# Patient Record
Sex: MALE | Race: WHITE | NOT HISPANIC OR LATINO
[De-identification: names, ages, dates, MRNs, and addresses within clinical notes are randomized per-mention and may not be internally consistent; named-entity substitution may affect disease eponyms.]

---

## 2021-06-24 ENCOUNTER — APPOINTMENT (OUTPATIENT)
Dept: UROLOGY | Facility: CLINIC | Age: 66
End: 2021-06-24
Payer: COMMERCIAL

## 2021-06-24 DIAGNOSIS — R31.0 GROSS HEMATURIA: ICD-10-CM

## 2021-06-24 DIAGNOSIS — N53.14 RETROGRADE EJACULATION: ICD-10-CM

## 2021-06-24 DIAGNOSIS — N39.0 URINARY TRACT INFECTION, SITE NOT SPECIFIED: ICD-10-CM

## 2021-06-24 LAB
BILIRUB UR QL STRIP: NORMAL
CLARITY UR: NORMAL
COLLECTION METHOD: NORMAL
GLUCOSE UR-MCNC: NORMAL
HCG UR QL: 0.2 EU/DL
HGB UR QL STRIP.AUTO: NORMAL
KETONES UR-MCNC: NORMAL
LEUKOCYTE ESTERASE UR QL STRIP: NORMAL
NITRITE UR QL STRIP: NORMAL
PH UR STRIP: 5.5
PROT UR STRIP-MCNC: 300
SP GR UR STRIP: 1.02

## 2021-06-24 PROCEDURE — 51798 US URINE CAPACITY MEASURE: CPT

## 2021-06-24 PROCEDURE — 52000 CYSTOURETHROSCOPY: CPT

## 2021-06-24 PROCEDURE — 99072 ADDL SUPL MATRL&STAF TM PHE: CPT

## 2021-06-24 PROCEDURE — 99204 OFFICE O/P NEW MOD 45 MIN: CPT | Mod: 25

## 2021-06-24 RX ORDER — SULFAMETHOXAZOLE AND TRIMETHOPRIM 800; 160 MG/1; MG/1
800-160 TABLET ORAL
Qty: 20 | Refills: 0 | Status: ACTIVE | COMMUNITY
Start: 2021-06-24 | End: 1900-01-01

## 2021-06-24 RX ORDER — CIPROFLOXACIN HYDROCHLORIDE 500 MG/1
500 TABLET, FILM COATED ORAL
Qty: 14 | Refills: 0 | Status: DISCONTINUED | COMMUNITY
Start: 2021-06-24 | End: 2021-06-24

## 2021-06-24 NOTE — HISTORY OF PRESENT ILLNESS
[Straining] : straining [Weak Stream] : weak stream [Intermittency] : intermittency [Hematuria - Gross] : gross hematuria [None] : None [Erectile Dysfunction] : Erectile Dysfunction [FreeTextEntry1] : 66M with LUTS and hematuria \par \par HPI: Greenlight 6 years ago in [place]. Patient reports he had an elevated PVR and that's why they did greenlight for him. He has had a reduced flow and stream over the past 2 weeks. Has discomfort and hematuria for a few days; no infectious symptoms or clots or pain. The hematuria was initial, or terminal, but never complete/total hematuria.  When he had the gross hematuria he states there "was some discomfort, like an itch or something". No dysuria, no flank pain. \par  \par Currently obstructive symptoms and has retrograde ejaculation but would like to donate sperm for IVF. \par \par ED - significant erectile dysfunction, failed PDE5i and currently using Trimix PRN.\par No dysuria; no stones; no clots with hematuria. \par \par Cysto today: cloudy urine, bladder wall c/w diffuse cystitis.  outlet appeared open.  After cystoscopic filling of the bladder, the PVR was noted to be: 38 cc. \par \par Bactrim given today in office and sent to pharmacy while awaiting culture results. \par \par FHx: No  malignancies; brother was on dialysis\par PMHx: None \par PSHx: Greenlight TURP \par Meds: None  \par Allergies: None \par Social: Never smoker; . Does crossfit; lives alone. Has a son.  [Urinary Incontinence] : no urinary incontinence [Urinary Retention] : no urinary retention [Urinary Urgency] : no urinary urgency [Urinary Frequency] : no urinary frequency [Dysuria] : no dysuria

## 2021-06-24 NOTE — ASSESSMENT
[FreeTextEntry1] : 66M with LUTS and recent hematuria episode; also interested in fertility assistance\par Cystoscopy performed c/w cystitis\par Started empirically on abx while culture sent\par \par PSA unknown\par Previous Greenlight TURP\par Significant ED - on Trimix\par Interested in fertility options \par PVR - 38cc\par \par Plan - \par 1.  Urinalysis, urine culture, urine cytology\par 2.  Cystoscopy if recurrent bleeding after UTI rx\par 3.  PSA\par 4.  Dr. Gibson for fertility consultation \par 5.  CT urogram\par

## 2021-06-24 NOTE — PHYSICAL EXAM
[General Appearance - Well Developed] : well developed [General Appearance - Well Nourished] : well nourished [Normal Appearance] : normal appearance [Well Groomed] : well groomed [General Appearance - In No Acute Distress] : no acute distress [Abdomen Soft] : soft [Abdomen Tenderness] : non-tender [Abdomen Mass (___ Cm)] : no abdominal mass palpated [Abdomen Hernia] : no hernia was discovered [Costovertebral Angle Tenderness] : no ~M costovertebral angle tenderness [Urethral Meatus] : meatus normal [Penis Abnormality] : normal circumcised penis [Urinary Bladder Findings] : the bladder was normal on palpation [Scrotum] : the scrotum was normal [Testes Mass (___cm)] : there were no testicular masses [Prostate Tenderness] : the prostate was not tender [No Prostate Nodules] : no prostate nodules [Prostate Size ___ (0-4)] : prostate size [unfilled] (scale: 0-4) [] : no respiratory distress [Edema] : no peripheral edema [Respiration, Rhythm And Depth] : normal respiratory rhythm and effort [Exaggerated Use Of Accessory Muscles For Inspiration] : no accessory muscle use [No Focal Deficits] : no focal deficits [Oriented To Time, Place, And Person] : oriented to person, place, and time [Affect] : the affect was normal [Not Anxious] : not anxious [Mood] : the mood was normal [No Palpable Adenopathy] : no palpable adenopathy [FreeTextEntry1] : rubbery prostate, left more than right but no obvious nodules

## 2021-07-20 ENCOUNTER — APPOINTMENT (OUTPATIENT)
Dept: UROLOGY | Facility: CLINIC | Age: 66
End: 2021-07-20
Payer: MEDICARE

## 2021-07-20 VITALS
TEMPERATURE: 98.3 F | BODY MASS INDEX: 24.44 KG/M2 | SYSTOLIC BLOOD PRESSURE: 118 MMHG | DIASTOLIC BLOOD PRESSURE: 74 MMHG | OXYGEN SATURATION: 98 % | WEIGHT: 207 LBS | HEART RATE: 71 BPM | HEIGHT: 77 IN

## 2021-07-20 DIAGNOSIS — R97.20 ELEVATED PROSTATE, SPECIFIC ANTIGEN [PSA]: ICD-10-CM

## 2021-07-20 LAB
APPEARANCE: ABNORMAL
BACTERIA UR CULT: ABNORMAL
BACTERIA: ABNORMAL
BILIRUBIN URINE: NEGATIVE
BLOOD URINE: ABNORMAL
COLOR: ABNORMAL
GLUCOSE QUALITATIVE U: NEGATIVE
HYALINE CASTS: 6 /LPF
KETONES URINE: NEGATIVE
LEUKOCYTE ESTERASE URINE: ABNORMAL
MICROSCOPIC-UA: NORMAL
NITRITE URINE: NEGATIVE
PH URINE: 6
PROTEIN URINE: ABNORMAL
PSA SERPL-MCNC: 4.82 NG/ML
RED BLOOD CELLS URINE: 4 /HPF
SPECIFIC GRAVITY URINE: 1.01
SQUAMOUS EPITHELIAL CELLS: 3 /HPF
URINE CYTOLOGY: NORMAL
UROBILINOGEN URINE: NORMAL
WHITE BLOOD CELLS URINE: >720 /HPF

## 2021-07-20 PROCEDURE — 51702 INSERT TEMP BLADDER CATH: CPT

## 2021-07-20 PROCEDURE — 99213 OFFICE O/P EST LOW 20 MIN: CPT | Mod: 25

## 2021-07-20 NOTE — HISTORY OF PRESENT ILLNESS
[FreeTextEntry1] : CC: retention\par \par Patient feeling well but  cc today.  He was seen ~1 month ago or LUTS and hematuria\par Cysto with bladder wall c/w cystitis and outlet appeared open.  He was able to empty fully last visit.\par \par PSA was check last visit and was  4.82  but in setting of infection\par Plan for TRUS today given elevated PVR\par \par \par \par FHx: No  malignancies; brother was on dialysis\par PMHx: None \par PSHx: Greenlight TURP \par Meds: None \par Allergies: None \par Social: Never smoker; . Does crossfit; lives alone. Has a son. \par

## 2021-07-20 NOTE — PHYSICAL EXAM
[General Appearance - Well Developed] : well developed [General Appearance - Well Nourished] : well nourished [Normal Appearance] : normal appearance [Well Groomed] : well groomed [General Appearance - In No Acute Distress] : no acute distress [Abdomen Soft] : soft [Abdomen Tenderness] : non-tender [Costovertebral Angle Tenderness] : no ~M costovertebral angle tenderness [Urethral Meatus] : meatus normal [Penis Abnormality] : normal circumcised penis [Scrotum] : the scrotum was normal [Testes Tenderness] : no tenderness of the testes [Testes Mass (___cm)] : there were no testicular masses [Oriented To Time, Place, And Person] : oriented to person, place, and time [Mood] : the mood was normal [Affect] : the affect was normal [Not Anxious] : not anxious [Normal Station and Gait] : the gait and station were normal for the patient's age [No Focal Deficits] : no focal deficits

## 2021-07-20 NOTE — ASSESSMENT
[FreeTextEntry1] : 66 year old male with LUTS and recent hematuria\par -TRUS reveals 19 cc prostate after greenlight TURP\par -Discussed option of CIC vs mercado catheter placement today.  Due to patients active lifestyle he elected to learn CIC today.\par -He was able to catheterize with 16 Fr straight catheter today via return demonstration, 900 cc emptied from bladder.\par -Will CIC 4-5 times a day\par -Voiding diary given to patient which he will complete before seeing Dr. Dickinson for UDS\par

## 2021-07-22 LAB — BACTERIA UR CULT: NORMAL

## 2021-07-23 ENCOUNTER — APPOINTMENT (OUTPATIENT)
Dept: UROLOGY | Facility: CLINIC | Age: 66
End: 2021-07-23
Payer: MEDICARE

## 2021-07-23 VITALS
HEART RATE: 76 BPM | TEMPERATURE: 97.7 F | SYSTOLIC BLOOD PRESSURE: 102 MMHG | DIASTOLIC BLOOD PRESSURE: 64 MMHG | OXYGEN SATURATION: 96 %

## 2021-07-23 DIAGNOSIS — R33.9 RETENTION OF URINE, UNSPECIFIED: ICD-10-CM

## 2021-07-23 PROCEDURE — 51784 ANAL/URINARY MUSCLE STUDY: CPT

## 2021-07-23 PROCEDURE — 51797 INTRAABDOMINAL PRESSURE TEST: CPT

## 2021-07-23 PROCEDURE — 51798 US URINE CAPACITY MEASURE: CPT

## 2021-07-23 PROCEDURE — 52000 CYSTOURETHROSCOPY: CPT

## 2021-07-23 PROCEDURE — 51728 CYSTOMETROGRAM W/VP: CPT

## 2021-07-23 PROCEDURE — 51741 ELECTRO-UROFLOWMETRY FIRST: CPT

## 2021-07-24 ENCOUNTER — OUTPATIENT (OUTPATIENT)
Dept: OUTPATIENT SERVICES | Facility: HOSPITAL | Age: 66
LOS: 1 days | End: 2021-07-24
Payer: MEDICARE

## 2021-07-24 ENCOUNTER — APPOINTMENT (OUTPATIENT)
Dept: CT IMAGING | Facility: HOSPITAL | Age: 66
End: 2021-07-24

## 2021-07-24 PROCEDURE — 74178 CT ABD&PLV WO CNTR FLWD CNTR: CPT

## 2021-07-24 PROCEDURE — 74178 CT ABD&PLV WO CNTR FLWD CNTR: CPT | Mod: 26,MH

## 2021-07-27 ENCOUNTER — APPOINTMENT (OUTPATIENT)
Dept: UROLOGY | Facility: CLINIC | Age: 66
End: 2021-07-27
Payer: MEDICARE

## 2021-07-27 VITALS
SYSTOLIC BLOOD PRESSURE: 125 MMHG | HEART RATE: 87 BPM | TEMPERATURE: 98.3 F | DIASTOLIC BLOOD PRESSURE: 75 MMHG | OXYGEN SATURATION: 96 %

## 2021-07-27 DIAGNOSIS — N32.0 BLADDER-NECK OBSTRUCTION: ICD-10-CM

## 2021-07-27 LAB — BACTERIA UR CULT: NORMAL

## 2021-07-27 PROCEDURE — 99213 OFFICE O/P EST LOW 20 MIN: CPT

## 2021-07-27 NOTE — HISTORY OF PRESENT ILLNESS
[FreeTextEntry1] : obstructed on UDS with function\par cysto by Dr. Dickinson with some obstructing tissue, visually\par \par Today we discussed the natural history of BPH and bladder outlet obstruction, risks of progression, risks of detrusor myopathy/areflexic bladder, bladder stones, UTI, worsening symptoms, risk of retention and other issues. \par \par All treatment options were reviewed.  This included surveillance, all medical therapeutic options, all outlet procedures including office based, TURP, bipolar TURP, button vaporization, Rezum, Aquablation, Urolift, thulium/holmium, suprapubic/retropubic simple (open, robotic) prostatectomy.   \par \par  All potential side effects of treatment were reviewed including, but not limited to: short term or permanent stress urinary incontinence and/or short term or permanent erectile dysfunction, rectal symptoms/pain, perineal pain, bleeding, rectal injury, recognized vs. unrecognized/delayed-recognition injury, TUR syndrome, risks of DVT, PE, MI, death, risks of cardiopulmonary/anesthesia related complications, positional injury, infection, ureteral injury/obstruction, bladder neck contracture, meatal stenosis, urethral stricture and other complications. \par \par My personal and institutional experience reviewed, as well as literature regarding these options.\par

## 2021-08-17 ENCOUNTER — OUTPATIENT (OUTPATIENT)
Dept: OUTPATIENT SERVICES | Facility: HOSPITAL | Age: 66
LOS: 1 days | End: 2021-08-17
Payer: MEDICARE

## 2021-08-17 PROCEDURE — 71046 X-RAY EXAM CHEST 2 VIEWS: CPT

## 2021-08-17 PROCEDURE — 71046 X-RAY EXAM CHEST 2 VIEWS: CPT | Mod: 26

## 2021-08-26 ENCOUNTER — NON-APPOINTMENT (OUTPATIENT)
Age: 66
End: 2021-08-26

## 2021-08-27 RX ORDER — CEPHALEXIN 500 MG/1
500 CAPSULE ORAL 3 TIMES DAILY
Qty: 9 | Refills: 0 | Status: ACTIVE | COMMUNITY
Start: 2021-08-27 | End: 1900-01-01

## 2021-08-31 ENCOUNTER — TRANSCRIPTION ENCOUNTER (OUTPATIENT)
Age: 66
End: 2021-08-31

## 2021-08-31 LAB — SARS-COV-2 N GENE NPH QL NAA+PROBE: NOT DETECTED

## 2021-09-01 ENCOUNTER — RESULT REVIEW (OUTPATIENT)
Age: 66
End: 2021-09-01

## 2021-09-01 ENCOUNTER — APPOINTMENT (OUTPATIENT)
Dept: UROLOGY | Facility: AMBULATORY SURGERY CENTER | Age: 66
End: 2021-09-01

## 2021-09-01 ENCOUNTER — OUTPATIENT (OUTPATIENT)
Dept: OUTPATIENT SERVICES | Facility: HOSPITAL | Age: 66
LOS: 1 days | Discharge: HOME | End: 2021-09-01
Payer: COMMERCIAL

## 2021-09-01 PROCEDURE — 52601 PROSTATECTOMY (TURP): CPT

## 2021-09-01 PROCEDURE — 88305 TISSUE EXAM BY PATHOLOGIST: CPT | Mod: 26

## 2021-09-01 RX ORDER — ACETAMINOPHEN WITH CODEINE 300MG-30MG
1 TABLET ORAL
Qty: 8 | Refills: 0
Start: 2021-09-01

## 2021-09-01 RX ORDER — DOCUSATE SODIUM 100 MG
1 CAPSULE ORAL
Qty: 28 | Refills: 0
Start: 2021-09-01 | End: 2021-09-14

## 2021-09-02 ENCOUNTER — APPOINTMENT (OUTPATIENT)
Dept: UROLOGY | Facility: CLINIC | Age: 66
End: 2021-09-02
Payer: COMMERCIAL

## 2021-09-02 VITALS — TEMPERATURE: 98.3 F | DIASTOLIC BLOOD PRESSURE: 67 MMHG | HEART RATE: 65 BPM | SYSTOLIC BLOOD PRESSURE: 113 MMHG

## 2021-09-02 PROCEDURE — 99024 POSTOP FOLLOW-UP VISIT: CPT

## 2021-09-07 ENCOUNTER — APPOINTMENT (OUTPATIENT)
Dept: UROLOGY | Facility: CLINIC | Age: 66
End: 2021-09-07
Payer: MEDICARE

## 2021-09-07 VITALS
SYSTOLIC BLOOD PRESSURE: 102 MMHG | DIASTOLIC BLOOD PRESSURE: 67 MMHG | HEIGHT: 77 IN | WEIGHT: 207 LBS | BODY MASS INDEX: 24.44 KG/M2 | HEART RATE: 73 BPM | TEMPERATURE: 98.6 F

## 2021-09-07 PROCEDURE — 51798 US URINE CAPACITY MEASURE: CPT

## 2021-09-07 PROCEDURE — 99213 OFFICE O/P EST LOW 20 MIN: CPT | Mod: 24

## 2021-09-07 NOTE — HISTORY OF PRESENT ILLNESS
[FreeTextEntry1] : S/P TURP\par Still retaining\par ~550 today\par Clear urine\par No fever\par \par Plan on CIC 2x/day, follow up with Dr. Quan or Levy ([facility], respectively) for Neuro-urology follow up. \par \par

## 2021-09-08 LAB — SURGICAL PATHOLOGY STUDY: SIGNIFICANT CHANGE UP

## 2021-10-06 PROBLEM — N32.0 BLADDER OUTLET OBSTRUCTION: Status: ACTIVE | Noted: 2021-07-27

## 2022-03-04 ENCOUNTER — NON-APPOINTMENT (OUTPATIENT)
Age: 67
End: 2022-03-04

## 2022-05-03 NOTE — HISTORY OF PRESENT ILLNESS
[FreeTextEntry1] : S/P TURP \par POD1\par \par Here for voiding trial \par Feeling well \par Urine color: \par \par Patient filled with 900 cc and Lam removed\par Patient able to void 650 \par \par PVR's were 700+ prior to surgery\par \par Follow up next week for PVR check \par 1x abx prophylaxis given today 
N/A

## 2022-09-22 ENCOUNTER — APPOINTMENT (OUTPATIENT)
Dept: UROLOGY | Facility: CLINIC | Age: 67
End: 2022-09-22

## 2022-09-22 DIAGNOSIS — Z00.00 ENCOUNTER FOR GENERAL ADULT MEDICAL EXAMINATION W/OUT ABNORMAL FINDINGS: ICD-10-CM

## 2022-09-22 PROCEDURE — 99215 OFFICE O/P EST HI 40 MIN: CPT | Mod: 25

## 2022-09-22 PROCEDURE — 51798 US URINE CAPACITY MEASURE: CPT

## 2022-09-22 NOTE — ASSESSMENT
[FreeTextEntry1] : Diagnosis: BPH, CAMEJO, retention, PSA screening ED\par \par Plan \par \par Trimix ICI\par PSA today\par Continue current urinary management; he understands r/b/a and potential complications of the residuals he carries.   Today the PVR was ~500cc. \par \par Sid Loaiza MD, FRCS \par  of Urology [facility]\par Director of Laparoscopic and Robotic Surgery \par [facility] Director of Urology, [facility] \par Professor of Urology\par \par (Office) [phone]\par (Cell)  [phone] \par [email or web address]\par \par \par

## 2022-09-22 NOTE — HISTORY OF PRESENT ILLNESS
[FreeTextEntry1] : Diagnosis: ED, incomplete bladder emptying, PSA screening \par \par HPI: Returns for follow up and doing well. 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 not CIC and he elects latter\par He has been doing well without any infections\par No hydronephrosis on 2021 CT urogram\par No other complaints other than ED, PSA screening\par \par ED - significant erectile dysfunction, failed PDE5i and currently using Trimix; this pre-dated the TURP I performed. \par \par \par \par FHx: No  malignancies; brother was on dialysis\par PMHx: None \par PSHx: Greenlight, TURP \par Meds: None \par Allergies: None \par Social: Never smoker; . Does crossfit; lives alone. Has a son. \par

## 2022-09-26 LAB — PSA SERPL-MCNC: 0.38 NG/ML

## 2024-05-10 RX ORDER — PAPAVERINE HYDROCHLORIDE 30 MG/ML
30 INJECTION, SOLUTION INTRAVENOUS
Qty: 5 | Refills: 1 | Status: ACTIVE | COMMUNITY
Start: 2022-03-14 | End: 1900-01-01

## 2025-08-05 ENCOUNTER — NON-APPOINTMENT (OUTPATIENT)
Age: 70
End: 2025-08-05

## 2025-08-07 ENCOUNTER — APPOINTMENT (OUTPATIENT)
Dept: UROLOGY | Facility: CLINIC | Age: 70
End: 2025-08-07
Payer: COMMERCIAL

## 2025-08-07 VITALS
DIASTOLIC BLOOD PRESSURE: 78 MMHG | BODY MASS INDEX: 24.79 KG/M2 | SYSTOLIC BLOOD PRESSURE: 129 MMHG | WEIGHT: 210 LBS | HEART RATE: 64 BPM | HEIGHT: 77 IN | TEMPERATURE: 97.7 F | OXYGEN SATURATION: 96 %

## 2025-08-07 PROCEDURE — 99214 OFFICE O/P EST MOD 30 MIN: CPT | Mod: 25

## 2025-08-07 PROCEDURE — 51798 US URINE CAPACITY MEASURE: CPT

## 2025-08-11 LAB — PSA SERPL-MCNC: 0.33 NG/ML
